# Patient Record
Sex: MALE | Race: WHITE | NOT HISPANIC OR LATINO | Employment: OTHER | URBAN - METROPOLITAN AREA
[De-identification: names, ages, dates, MRNs, and addresses within clinical notes are randomized per-mention and may not be internally consistent; named-entity substitution may affect disease eponyms.]

---

## 2022-07-21 ENCOUNTER — TRANSFERRED RECORDS (OUTPATIENT)
Dept: HEALTH INFORMATION MANAGEMENT | Facility: CLINIC | Age: 72
End: 2022-07-21

## 2022-11-25 NOTE — TELEPHONE ENCOUNTER
DIAGNOSIS: Left knee pain    APPOINTMENT DATE: 11/29/2022   NOTES STATUS DETAILS   OFFICE NOTE from referring provider N/A    OFFICE NOTE from other specialist Care Everywhere 08/19/2020 PT [facility]   06/12/2019 [facility]    DISCHARGE SUMMARY from hospital N/A    DISCHARGE REPORT from the ER N/A    OPERATIVE REPORT Care Everywhere 09/25/2019 Left Total Knee Arthroplasty, Evan   MEDICATION LIST N/A    EMG (for Spine) N/A    IMPLANT RECORD/STICKER N/A    LABS     CBC/DIFF N/A    CULTURES N/A    INJECTIONS DONE IN RADIOLOGY N/A    MRI N/A    CT SCAN N/A    XRAYS (IMAGES & REPORTS) N/A    TUMOR     PATHOLOGY  Slides & report N/A      Images in PACS  
No

## 2022-11-29 ENCOUNTER — PRE VISIT (OUTPATIENT)
Dept: ORTHOPEDICS | Facility: CLINIC | Age: 72
End: 2022-11-29

## 2023-01-02 ENCOUNTER — TELEPHONE (OUTPATIENT)
Dept: ORTHOPEDICS | Facility: CLINIC | Age: 73
End: 2023-01-02

## 2023-01-02 NOTE — TELEPHONE ENCOUNTER
Left knee 7/21/22  Records in care everywhere.    Called patient to relay that we have received his XRs from 7/21/22 and have his records.     Tatiana Robins MA, ATC

## 2023-01-02 NOTE — TELEPHONE ENCOUNTER
[facility] Call Center    Phone Message    May a detailed message be left on voicemail: yes     Reason for Call: Other: Patient is requesting a call back to let him know if the clinic received his imaging and notes.     Action Taken: Message routed to:  Adult Clinics: Orthopedics p [id number]    Travel Screening: Not Applicable

## 2023-01-03 ENCOUNTER — MEDICAL CORRESPONDENCE (OUTPATIENT)
Dept: HEALTH INFORMATION MANAGEMENT | Facility: CLINIC | Age: 73
End: 2023-01-03

## 2023-02-14 ENCOUNTER — OFFICE VISIT (OUTPATIENT)
Dept: ORTHOPEDICS | Facility: CLINIC | Age: 73
End: 2023-02-14
Payer: COMMERCIAL

## 2023-02-14 DIAGNOSIS — Z96.651 STATUS POST TOTAL RIGHT KNEE REPLACEMENT: Primary | ICD-10-CM

## 2023-02-14 PROCEDURE — 99203 OFFICE O/P NEW LOW 30 MIN: CPT | Performed by: ORTHOPAEDIC SURGERY

## 2023-02-14 RX ORDER — INSULIN ASPART 100 [IU]/ML
INJECTION, SOLUTION INTRAVENOUS; SUBCUTANEOUS
COMMUNITY
Start: 2022-03-16

## 2023-02-14 RX ORDER — AMLODIPINE BESYLATE 10 MG/1
1 TABLET ORAL EVERY MORNING
COMMUNITY
Start: 2022-12-28

## 2023-02-14 RX ORDER — PRAVASTATIN SODIUM 40 MG
40 TABLET ORAL EVERY MORNING
COMMUNITY
Start: 2023-01-06

## 2023-02-14 RX ORDER — VALSARTAN AND HYDROCHLOROTHIAZIDE 320; 25 MG/1; MG/1
1 TABLET, FILM COATED ORAL EVERY MORNING
COMMUNITY
Start: 2023-01-06

## 2023-02-14 RX ORDER — CARVEDILOL 25 MG/1
25 TABLET ORAL
COMMUNITY
Start: 2022-12-28

## 2023-02-14 RX ORDER — ERGOCALCIFEROL 1.25 MG/1
50000 CAPSULE, LIQUID FILLED ORAL WEEKLY
COMMUNITY
Start: 2022-07-22

## 2023-02-14 RX ORDER — PEN NEEDLE, DIABETIC 29 G X1/2"
NEEDLE, DISPOSABLE MISCELLANEOUS
COMMUNITY
Start: 2022-03-15

## 2023-02-14 RX ORDER — LANCETS
EACH MISCELLANEOUS
COMMUNITY
Start: 2022-11-15

## 2023-02-14 RX ORDER — BLOOD-GLUCOSE METER
EACH MISCELLANEOUS
COMMUNITY
Start: 2022-08-26

## 2023-02-14 ASSESSMENT — PAIN SCALES - GENERAL: PAINLEVEL: MODERATE PAIN (5)

## 2023-02-14 NOTE — NURSING NOTE
Kali Joyner's chief complaint for this visit includes:  Chief Complaint   Patient presents with     Consult     LTKA done by [facility], pain in knee        Referring Provider:  No referring provider defined for this encounter.    There were no vitals taken for this visit.  Moderate Pain (5)     Pain increases with: walking, bending, twisting  Previous surgeries: LTKA 9/25/19  Previous injections within last 6 months: NO  Treatments done: Injections - no help after first 2 days, brace   Pain: 5/10  Concerns: 2nd opinion on status of replaced knee. W/C related originally, will be part of ongoing litigation.     Gerald Whyte, ATC

## 2023-02-14 NOTE — PROGRESS NOTES
"Assessment: This is a 72 year old four years our from a left total knee with chief complaint of clicking feeling in knee that is reproducible with V/V stress on examination. This feels like a well balanced.  Radiographs are appropriate. Reviewed and discussed his implant and function.     Plan:  Routine follow-up left total knee, next radiograph 5 years.     Chief Complaint: Consult (LTKA done by [facility], pain in knee )    Physician:  No ref. provider found    HPI: Kali Joyner is a 72 year old male who presents today for evaluation of his left total knee. He reports feeling a \"grinding\" when he flexes his knee.     Symptom Profile  Location of symptoms:  Anterior knee   Onset: post-operative total knee  Trend: getting no better or worse   Duration of symptoms: since surgery three years ago   Quality of symptoms: aching, sharp/stabbing  Severity: moderate to severe   Alleviate: activity modification  Exacerbating: activities  Previous Treatments: Previous treatments include activity modification, oral pain medication    HOOS  Pain: 55.55   Symptoms:42.86   ADLs: 45.69   Sports/Recreation: 35   Quality of Life:6.25  (http://koos.nu/)  [facility] Activity Score: 3    PROMIS Mental: declined  PROMIS Physical    MEDICAL HISTORY: No past medical history on file.  DM  AFIB on warfarin  HTN  Medications:     Current Outpatient Medications:      amLODIPine (NORVASC) 10 MG tablet, Take 1 tablet by mouth every morning, Disp: , Rfl:      carvedilol (COREG) 25 MG tablet, Take 25 mg by mouth, Disp: , Rfl:      insulin degludec (TRESIBA) 100 UNIT/ML pen, Inject 35 units once daily in the morning., Disp: , Rfl:      BD INSULIN SYRINGE ULTRAFINE 30G X 1/2\" 1 ML miscellaneous, USE FOUR TIMES A DAY, Disp: , Rfl:      Continuous Blood Gluc  (FREESTYLE MONIQUE 2 READER) DALILA, See Admin Instructions, Disp: , Rfl:      Continuous Blood Gluc Sensor (FREESTYLE MONIQUE 2 SENSOR) [facility], TEST EVERY 2 WEEKS. REPLACE SENSOR EVERY 14 " DAYS., Disp: , Rfl:      Contour Next EZ (CONTOUR NEXT EZ W/DEVICE KIT) w/Device KIT, TEST THREE TIMES A DAY, Disp: , Rfl:      CONTOUR NEXT TEST test strip, TEST THREE TIMES A DAY, Disp: , Rfl:      Insulin Aspart FlexPen 100 UNIT/ML SOPN, INJECT 22 UNITS SUBCUTANEOUSLY AS DIRECTED IN THE MORNING AND IN THE EVENING, Disp: , Rfl:      metFORMIN (GLUCOPHAGE) 1000 MG tablet, Take 1 tablet by mouth 2 times daily, Disp: , Rfl:      Microlet Lancets MISC, TEST THREE TIMES A DAY, Disp: , Rfl:      pravastatin (PRAVACHOL) 40 MG tablet, Take 40 mg by mouth every morning, Disp: , Rfl:      valsartan-hydrochlorothiazide (DIOVAN HCT) 320-25 MG tablet, Take 1 tablet by mouth every morning, Disp: , Rfl:      vitamin D2 (ERGOCALCIFEROL) 75374 units (1250 mcg) capsule, Take 50,000 Units by mouth once a week, Disp: , Rfl:     Allergies: Patient has no known allergies.    SURGICAL HISTORY: No past surgical history on file.  Left TKA  FAMILY HISTORY: No family history on file.  DM  SOCIAL HISTORY:   Social History     Tobacco Use     Smoking status: Not on file     Smokeless tobacco: Not on file   Substance Use Topics     Alcohol use: Not on file   retired  , lives with wife  Non smoker,     REVIEW OF SYSTEMS:  The comprehensive review of systems from the intake form was reviewed with the patient.  No fever, weight change or fatigue. No dry eyes. No oral ulcers, sore throat or voice change. No palpitations, syncope, angina or edema.  No chest pain, excessive sleepiness, shortness of breath or hemoptysis.   No abdominal pain, nausea, vomiting, diarrhea or heartburn.  No skin rash. No focal weakness or numbness. No bleeding or lymphadenopathy. No rhinitis or hives.     Exam:  On physical examination the patient appears the stated age, is in no acute distress, affect is appropriate, and breathing is non-labored.  Vitals are documented in the EMR and have been reviewed:    There were no vitals taken for this visit.  Data  Unavailable  There is no height or weight on file to calculate BMI.    Rises from chair:a little slowly   Gait: short strides non-antalgic   Gains the exam table: easily, tall   Left Knee  Appearance: benign  Clinical alignment: neutral  Effusion: no  Tenderness to palpation: no  Extension:0  Flexion:  120  Collateral ligaments: intact, this is well balanced, can reproduce the clicking with V/V stressing in extension.   Stable in in the sagittal plane in mid-flexion.    Hip examination: benign hip ROM with groin or anterior thigh symptoms.     Distally, the circulatory, motor, and sensation exam is intact with 5/5 EHL, gastroc-soleus, and tibialis anterior.    Sensation to light touch is intact.    Dorsalis pedis and posterior tibialis pulses are palpable.    There are no sores on the feet, no bruising, and no lymphedema.    X-rays:   We reviewed the radiographs together. These show the normal progression for a total hip arthroplasty without evidence of loosening or subsidence.   Pre-operative radiographs with Kellgren 4 OA

## 2023-02-14 NOTE — LETTER
"    2/14/2023         RE: Kali Joyner  [address] [facility] [address]        Dear Colleague,    Thank you for referring your patient, Kali Joyner, to the [facility]. Please see a copy of my visit note below.    Assessment: This is a 72 year old four years our from a left total knee with chief complaint of clicking feeling in knee that is reproducible with V/V stress on examination. This feels like a well balanced.  Radiographs are appropriate. Reviewed and discussed his implant and function.     Plan:  Routine follow-up left total knee, next radiograph 5 years.     Chief Complaint: Consult (LTKA done by [facility], pain in knee )    Physician:  No ref. provider found    HPI: Kali Joyner is a 72 year old male who presents today for evaluation of his left total knee. He reports feeling a \"grinding\" when he flexes his knee.     Symptom Profile  Location of symptoms:  Anterior knee   Onset: post-operative total knee  Trend: getting no better or worse   Duration of symptoms: since surgery three years ago   Quality of symptoms: aching, sharp/stabbing  Severity: moderate to severe   Alleviate: activity modification  Exacerbating: activities  Previous Treatments: Previous treatments include activity modification, oral pain medication    HOOS  Pain: 55.55   Symptoms:42.86   ADLs: 45.69   Sports/Recreation: 35   Quality of Life:6.25  (http://koos.nu/)  [facility] Activity Score: 3    PROMIS Mental: declined  PROMIS Physical    MEDICAL HISTORY: No past medical history on file.  DM  AFIB on warfarin  HTN  Medications:     Current Outpatient Medications:      amLODIPine (NORVASC) 10 MG tablet, Take 1 tablet by mouth every morning, Disp: , Rfl:      carvedilol (COREG) 25 MG tablet, Take 25 mg by mouth, Disp: , Rfl:      insulin degludec (TRESIBA) 100 UNIT/ML pen, Inject 35 units once daily in the morning., Disp: , Rfl:      BD INSULIN SYRINGE ULTRAFINE 30G X 1/2\" 1 ML miscellaneous, USE FOUR " TIMES A DAY, Disp: , Rfl:      Continuous Blood Gluc  (FREESTYLE MONIQUE 2 READER) DALILA, See Admin Instructions, Disp: , Rfl:      Continuous Blood Gluc Sensor (FREESTYLE MONIQUE 2 SENSOR) MISC, TEST EVERY 2 WEEKS. REPLACE SENSOR EVERY 14 DAYS., Disp: , Rfl:      Contour Next EZ (CONTOUR NEXT EZ W/DEVICE KIT) w/Device KIT, TEST THREE TIMES A DAY, Disp: , Rfl:      CONTOUR NEXT TEST test strip, TEST THREE TIMES A DAY, Disp: , Rfl:      Insulin Aspart FlexPen 100 UNIT/ML SOPN, INJECT 22 UNITS SUBCUTANEOUSLY AS DIRECTED IN THE MORNING AND IN THE EVENING, Disp: , Rfl:      metFORMIN (GLUCOPHAGE) 1000 MG tablet, Take 1 tablet by mouth 2 times daily, Disp: , Rfl:      Microlet Lancets MISC, TEST THREE TIMES A DAY, Disp: , Rfl:      pravastatin (PRAVACHOL) 40 MG tablet, Take 40 mg by mouth every morning, Disp: , Rfl:      valsartan-hydrochlorothiazide (DIOVAN HCT) 320-25 MG tablet, Take 1 tablet by mouth every morning, Disp: , Rfl:      vitamin D2 (ERGOCALCIFEROL) 93976 units (1250 mcg) capsule, Take 50,000 Units by mouth once a week, Disp: , Rfl:     Allergies: Patient has no known allergies.    SURGICAL HISTORY: No past surgical history on file.  Left TKA  FAMILY HISTORY: No family history on file.  DM  SOCIAL HISTORY:   Social History     Tobacco Use     Smoking status: Not on file     Smokeless tobacco: Not on file   Substance Use Topics     Alcohol use: Not on file   retired  , lives with wife  Non smoker,     REVIEW OF SYSTEMS:  The comprehensive review of systems from the intake form was reviewed with the patient.  No fever, weight change or fatigue. No dry eyes. No oral ulcers, sore throat or voice change. No palpitations, syncope, angina or edema.  No chest pain, excessive sleepiness, shortness of breath or hemoptysis.   No abdominal pain, nausea, vomiting, diarrhea or heartburn.  No skin rash. No focal weakness or numbness. No bleeding or lymphadenopathy. No rhinitis or hives.     Exam:  On physical  examination the patient appears the stated age, is in no acute distress, affect is appropriate, and breathing is non-labored.  Vitals are documented in the EMR and have been reviewed:    There were no vitals taken for this visit.  Data Unavailable  There is no height or weight on file to calculate BMI.    Rises from chair:a little slowly   Gait: short strides non-antalgic   Gains the exam table: easily, tall   Left Knee  Appearance: benign  Clinical alignment: neutral  Effusion: no  Tenderness to palpation: no  Extension:0  Flexion:  120  Collateral ligaments: intact, this is well balanced, can reproduce the clicking with V/V stressing in extension.   Stable in in the sagittal plane in mid-flexion.    Hip examination: benign hip ROM with groin or anterior thigh symptoms.     Distally, the circulatory, motor, and sensation exam is intact with 5/5 EHL, gastroc-soleus, and tibialis anterior.    Sensation to light touch is intact.    Dorsalis pedis and posterior tibialis pulses are palpable.    There are no sores on the feet, no bruising, and no lymphedema.    X-rays:   We reviewed the radiographs together. These show the normal progression for a total hip arthroplasty without evidence of loosening or subsidence.   Pre-operative radiographs with Kellgren 4 OA            Again, thank you for allowing me to participate in the care of your patient.        Sincerely,        Vijay Kent MD

## 2023-11-29 LAB
ALT SERPL-CCNC: 15 U/L (ref 0–55)
AST SERPL-CCNC: 15 U/L (ref 5–40)
CHOLESTEROL (EXTERNAL): 102 MG/DL
CREATININE (EXTERNAL): 1.1 MG/DL (ref 0.5–1.5)
GFR ESTIMATED (EXTERNAL): 75 ML/MIN/1.73M2
GLUCOSE (EXTERNAL): 173 MG/DL (ref 70–180)
HBA1C MFR BLD: 6 % (ref 4–6)
HDLC SERPL-MCNC: 31 MG/DL
LDL CHOLESTEROL CALCULATED (EXTERNAL): 41 MG/DL
POTASSIUM (EXTERNAL): 3.6 MMOL/L (ref 3.5–5)
TRIGLYCERIDES (EXTERNAL): 154 MG/DL (ref 0–199)

## 2024-02-08 ENCOUNTER — TRANSFERRED RECORDS (OUTPATIENT)
Dept: HEALTH INFORMATION MANAGEMENT | Facility: CLINIC | Age: 74
End: 2024-02-08

## 2024-02-08 LAB — RETINOPATHY: NEGATIVE

## 2024-04-11 ENCOUNTER — TRANSFERRED RECORDS (OUTPATIENT)
Dept: HEALTH INFORMATION MANAGEMENT | Facility: CLINIC | Age: 74
End: 2024-04-11

## 2024-07-31 LAB — RETINOPATHY: POSITIVE

## 2024-08-01 ENCOUNTER — TRANSCRIBE ORDERS (OUTPATIENT)
Dept: OTHER | Age: 74
End: 2024-08-01

## 2024-08-01 ENCOUNTER — TRANSFERRED RECORDS (OUTPATIENT)
Dept: HEALTH INFORMATION MANAGEMENT | Facility: CLINIC | Age: 74
End: 2024-08-01
Payer: COMMERCIAL

## 2024-08-01 DIAGNOSIS — Q10.0 CONGENITAL PTOSIS: Primary | ICD-10-CM

## 2024-08-02 ENCOUNTER — TELEPHONE (OUTPATIENT)
Dept: OPHTHALMOLOGY | Facility: CLINIC | Age: 74
End: 2024-08-02
Payer: COMMERCIAL

## 2024-08-02 NOTE — TELEPHONE ENCOUNTER
[facility] Call Center    Phone Message    May a detailed message be left on voicemail: yes     Reason for Call: Form or Letter   Type or form/letter needing completion: appt packet w/ clinic directions and appt information  Provider: Jamie  Date form needed: prior   Once completed: Mail form to Name: Kali Joyner at Address: [address] [place] [address], [id number]      Action Taken: Message routed to:  [facility]    Travel Screening: Not Applicable     Date of Service:

## 2024-12-11 ENCOUNTER — OFFICE VISIT (OUTPATIENT)
Dept: OPHTHALMOLOGY | Facility: CLINIC | Age: 74
End: 2024-12-11
Payer: COMMERCIAL

## 2024-12-11 ENCOUNTER — TELEPHONE (OUTPATIENT)
Dept: OPHTHALMOLOGY | Facility: CLINIC | Age: 74
End: 2024-12-11
Payer: COMMERCIAL

## 2024-12-11 DIAGNOSIS — Z96.1 PSEUDOPHAKIA OF BOTH EYES: ICD-10-CM

## 2024-12-11 DIAGNOSIS — H02.401 INVOLUTIONAL PTOSIS, ACQUIRED, RIGHT: ICD-10-CM

## 2024-12-11 DIAGNOSIS — H02.402 INVOLUTIONAL PTOSIS, ACQUIRED, LEFT: Primary | ICD-10-CM

## 2024-12-11 RX ORDER — WARFARIN SODIUM 5 MG/1
1 TABLET ORAL DAILY
COMMUNITY
Start: 2023-11-21

## 2024-12-11 ASSESSMENT — TONOMETRY
OS_IOP_MMHG: 16
OD_IOP_MMHG: 16
IOP_METHOD: ICARE

## 2024-12-11 ASSESSMENT — CONF VISUAL FIELD: COMMENTS: TANGENT VISUAL FIELD WAS PERFORMED TODAY

## 2024-12-11 ASSESSMENT — VISUAL ACUITY
OS_SC: 20/30
OD_SC: 20/25
OD_SC+: -3
METHOD: SNELLEN - LINEAR
OS_SC+: -1

## 2024-12-11 NOTE — TELEPHONE ENCOUNTER
[facility] Call [facility]    Phone Message    May a detailed message be left on voicemail: yes     Reason for Call: Other: Patient would like to know if he'll have to come back down after his procedure for any post-op visits.     Please reach out to patient and advise    Thank You!    Action Taken: Message routed to:  [facility]    Travel Screening: Not Applicable     Date of Service:

## 2024-12-11 NOTE — NURSING NOTE
Chief Complaints and History of Present Illnesses   Patient presents with    Droopy Left Upper Lid     Chief Complaint(s) and History of Present Illness(es)       Droopy Left Upper Lid              Laterality: left upper lid              Comments    [facility] the [facility]. He has been aware of this drooping lid for at least 10 years. He  was scheduled for surgery about 4 years ago, but it was denied by his insurance and considered cosmetic (was not [facility] designated at that time). He has to manually lift the left lid occasionally to read.    S/P cataract surgery 3/2024. He is a glaucoma suspect. At one time prescribed Latanoprost, but he is not presently on this therapy. Pt is a diabetic, last A1C 6.4. Pt has afib and is on warfarin. No lung conditions.

## 2024-12-11 NOTE — PROGRESS NOTES
[facility] New Patient    Patient: Kali Joyner MRN# [id number]   YOB: 1950 Age: 74 year old   Date of Visit: Dec 11, 2024    CC: Droopy eyelids obstructing vision.              HPI:     Chief Complaint(s) and History of Present Illness(es)     Droopy Left Upper Lid            Laterality: left upper lid          Comments    [facility] of the left upper lid. He   has been aware of this drooping lid for at least 10 years. He  was   scheduled for surgery about 4 years ago, but it was denied by his   insurance and considered cosmetic (was not [facility] designated at that time). He   has to manually lift the left lid occasionally to read.    S/P cataract surgery 3/2024. He is a glaucoma suspect. At one time   prescribed Latanoprost, but he is not presently on this therapy. Pt is a   diabetic, last A1C 6.4. Pt has afib and is on warfarin. No lung   conditions.    Had cataract surgery in the past year.     He thinks the left upper eyelid started drooping about 10 years ago. It was not drooping as a child. The droopy eyelid is interfering with activities of daily living including driving, and reading. The patient denies double vision, variability of the eyelid position. No trouble speaking/swallowing     EXAM:     MRD1: 3 mm right eye and -1 mm left eye   Severe aponeurotic ptosis     VISUAL FIELD:  Right eye untaped:15 degrees Right eye taped:35 degrees  20 degree improvement  Left eye untaped:10 degrees Left eye taped:30 degrees   20 degree improvement    Assessment & Plan     Kali Joyner is a 74 year old male with the following diagnoses:   1. Involutional ptosis, acquired, left    2. Involutional ptosis, acquired, right    3. Pseudophakia of both eyes         He has aponeurotic ptosis both eyes, but only symptomatic left eye.    Discussed options.     Plan left upper eyelid ptosis repair posterior approach 8 mm in procedure room [address]15  (Will need putterman clamp).      The heavy upper eyelids are causing symptoms as documented above. The condition is not secondary to underlying Sjogren's, myasthenia gravis, or polymyositis.   ANTICOAGULATION:  Warfarin, will need INR <2         PHOTOS DEMONSTRATE:    Blepharoptosis    Attending Physician Attestation: Complete documentation of historical and exam elements from today's encounter can be found in the full encounter summary report (not reduplicated in this progress note). I personally obtained the chief complaint(s) and history of present illness. I confirmed and edited as necessary the review of systems, past medical/surgical history, family history, social history, and examination findings as documented by others; and I examined the patient myself. I personally reviewed the relevant tests, images, and reports as documented above. I formulated and edited as necessary the assessment and plan and discussed the findings and management plan with the patient. Rex Ayala MD      Today with Kali Joyner I reviewed the indications, risks, benefits, and alternatives of the proposed surgical procedure including, but not limited to, failure obtain the desired result  and need for additional surgery, bleeding, infection, loss of vision, or injury to the eye, dry eyes, and the remote possibility of permanent damage to any organ system or death with the use of anesthesia. We also discussed the risk of Herring drop of the right upper eyelid.  I provided multiple opportunities for the questions, answered all questions to the best of my ability, and confirmed that my answers and my discussion were understood.

## 2024-12-12 NOTE — TELEPHONE ENCOUNTER
Scheduled pt for 3 week post op following in clinic ptosis repair.   Daya WEST 8:32 AM December 12, 2024

## 2025-01-22 ENCOUNTER — TELEPHONE (OUTPATIENT)
Dept: OPHTHALMOLOGY | Facility: CLINIC | Age: 75
End: 2025-01-22
Payer: COMMERCIAL

## 2025-01-22 NOTE — TELEPHONE ENCOUNTER
Procedure conflict with another scheduled appt. Moved him out one more week for in clinic procedure. He will discuss with his care team if he can be off the blood thinner for that long for both his scheduled procedures.  Daya Mathew COA 12:09 PM January 22, 2025

## 2025-01-22 NOTE — TELEPHONE ENCOUNTER
[facility] Call Center    Phone Message    May a detailed message be left on voicemail: yes     Reason for Call: Other: Patient called requesting a call back from care team. He is wanting to discuss possibly rescheduling his upcoming in clinic procedure. Please call to advise.      Action Taken: Other: eye    Travel Screening: Not Applicable

## 2025-01-22 NOTE — TELEPHONE ENCOUNTER
[facility] Call Center    Phone Message    May a detailed message be left on voicemail: yes     Reason for Call: Other: Patient called requesting a call back from care team. He states he has a prostate biopsy scheduled the day before his in clinic eyelid procedure. He wants to make sure that will be ok and not interfere with things. Please call to advise.      Action Taken: Other: eye    Travel Screening: Not Applicable

## 2025-01-22 NOTE — TELEPHONE ENCOUNTER
Spoke with pt. Ok to have eye procedure the day after biopsy if he feels he will be up for both back to back like that. Pt has to hold warfarin for 7 days prior to biopsy so INR level should be low enough for eye procedure the next day.     Cecy William, COT, 2:13 PM 01/22/2025

## 2025-02-12 ENCOUNTER — OFFICE VISIT (OUTPATIENT)
Dept: OPHTHALMOLOGY | Facility: CLINIC | Age: 75
End: 2025-02-12
Payer: COMMERCIAL

## 2025-02-12 DIAGNOSIS — H02.402 INVOLUTIONAL PTOSIS, ACQUIRED, LEFT: Primary | ICD-10-CM

## 2025-02-12 RX ORDER — ERYTHROMYCIN 5 MG/G
OINTMENT OPHTHALMIC ONCE
Status: COMPLETED | OUTPATIENT
Start: 2025-02-12 | End: 2025-02-12

## 2025-02-12 RX ADMIN — ERYTHROMYCIN: 5 OINTMENT OPHTHALMIC at 13:56

## 2025-02-12 NOTE — PROGRESS NOTES
PREOPERATIVE DIAGNOSIS: Left upper eyelid ptosis.   POSTOPERATIVE DIAGNOSIS:  Left upper eyelid ptosis.   PROCEDURE: Left upper eyelid ptosis repair - posterior approach mm advancement  SURGEON: Rex Ayala MD  ASSISTANT: None  ANESTHESIA: 1% lidocaine with epinephrine  COMPLICATIONS: None.   ESTIMATED BLOOD LOSS: 1 mL   HISTORY: Kali Joyner presented with upper lid ptosis interfering with the superior visual field and activities of daily living. After the risks, benefits and alternatives to the proposed procedure were explained, informed consent was obtained.   PROCEDURE: The patient was brought to the operating room and placed supine on the table. The left upper eyelid was infiltrated with local anesthetic and  was prepped and draped in the typical sterile ophthalmic fashion. Atttention was directed to the left  side.  A 4-0 silk suture was placed through the eyelid margin and the eyelid everted over a Desmarres retractor. A 6-0 silk suture was then threaded through the conjunctiva, levator aponeurosis and Peng's muscle 4 mm from the superior tarsal border in order to excise a total of 8 mm of muscle and conjunctiva. These sutures were used to elevate the conjunctiva and Peng's muscle. The Putterman clamp was used and clamped over the elevated tissues. A 6-0 plain gut suture was run in a horizontal mattress fashion 1 mm below the clamp from lateral to medial, then medial to lateral. The elevated tissues were excised with a 15 blade. The sutures were then externalized and tied in the lid crease, effectively advancing the Peng's muscle, levator aponeurosis, and conjunctiva. The 4-0 silk suture was removed. The lid was reverted to its normal position and ophthalmic antibiotic ointment placed in the eye.   I was present for the entire procedure. Rex Ayala MD

## 2025-02-12 NOTE — PATIENT INSTRUCTIONS
Use cold compresses 10 minutes every hour for the first 48 hours while awake to minimize bruising and swelling. It works well to put a washcloth in a bowl of ice water and use the cold washcloth.     Use a warm compress 5-10 minutes 4 times per day for the next 2 days or as needed prior to next appointment.     Apply the prescribed/provided ointment into the left eye three times a day. It will make your vision a bit blurry. You can do this for 10 days then stop. Use artificial tears as needed for irritation.

## 2025-02-26 ENCOUNTER — OFFICE VISIT (OUTPATIENT)
Dept: OPHTHALMOLOGY | Facility: CLINIC | Age: 75
End: 2025-02-26
Payer: COMMERCIAL

## 2025-02-26 DIAGNOSIS — H02.402 INVOLUTIONAL PTOSIS, ACQUIRED, LEFT: Primary | ICD-10-CM

## 2025-02-26 RX ORDER — TAMSULOSIN HYDROCHLORIDE 0.4 MG/1
0.8 CAPSULE ORAL DAILY
COMMUNITY

## 2025-02-26 ASSESSMENT — TONOMETRY
OD_IOP_MMHG: 13
OS_IOP_MMHG: 15
IOP_METHOD: ICARE

## 2025-02-26 ASSESSMENT — VISUAL ACUITY
OS_SC: 20/30
OS_SC+: -1
OD_SC+: -1
METHOD: SNELLEN - LINEAR
OD_SC: 20/20

## 2025-02-26 NOTE — PROGRESS NOTES
"Chief Complaint(s) and History of Present Illness(es)     Post Op (Ophthalmology) Left Eye            Laterality: left eye          Comments    S/P Left upper eyelid ptosis repair - posterior approach mm advancement   2/12/2025. Pt is pleased with the outcome of his lid procedure, and feels   that his lid now appear symmetrical. He can see a lot better out of the   left eye now!    Pt completed the ocular therapies 2 days ago.     Doing well. Happy with outcome.    Patient Instructions   - Apply warm compresses for 1 minute two to three times a day until your bruising has resolved. You can continue this for one more month.   - Apply Aquaphor or Vaseline to the incision at bedtime until it is smooth.    - If you have symptoms of eye irritation, it is good to use over the counter artificial tears. Good brands include Refresh, Blink, and Systane. Do NOT get drops that are for \"red eyes.\"     No follow-ups on file.      Attending Physician Attestation: Complete documentation of historical and exam elements from today's encounter can be found in the full encounter summary report (not reduplicated in this progress note). I personally obtained the chief complaint(s) and history of present illness. I confirmed and edited as necessary the review of systems, past medical/surgical history, family history, social history, and examination findings as documented by others; and I examined the patient myself. I personally reviewed the relevant tests, images, and reports as documented above. I formulated and edited as necessary the assessment and plan and discussed the findings and management plan with the patient and family. I personally reviewed the ophthalmic test(s) associated with this encounter, agree with the interpretation(s) as documented by the resident/fellow, and have edited the corresponding report(s) as necessary. Rex Ayala MD    "

## 2025-02-26 NOTE — NURSING NOTE
Chief Complaints and History of Present Illnesses   Patient presents with    Post Op (Ophthalmology) Left Eye     Chief Complaint(s) and History of Present Illness(es)       Post Op (Ophthalmology) Left Eye              Laterality: left eye              Comments    S/P Left upper eyelid ptosis repair - posterior approach mm advancement 2/12/2025. Pt is pleased with the outcome of his lid procedure, and feels that his lid now appear symmetrical. He can see a lot better out of the left eye now!    Pt completed the ocular therapies 2 days ago.

## 2025-02-26 NOTE — PATIENT INSTRUCTIONS
"- Apply warm compresses for 1 minute two to three times a day until your bruising has resolved. You can continue this for one more month.   - Apply Aquaphor or Vaseline to the incision at bedtime until it is smooth.    - If you have symptoms of eye irritation, it is good to use over the counter artificial tears. Good brands include Refresh, Blink, and Systane. Do NOT get drops that are for \"red eyes.\"     "